# Patient Record
Sex: FEMALE | Race: WHITE | NOT HISPANIC OR LATINO | Employment: STUDENT | URBAN - METROPOLITAN AREA
[De-identification: names, ages, dates, MRNs, and addresses within clinical notes are randomized per-mention and may not be internally consistent; named-entity substitution may affect disease eponyms.]

---
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
## 2024-02-17 DIAGNOSIS — R56.9 SEIZURES (HCC): ICD-10-CM

## 2024-02-20 RX ORDER — LACOSAMIDE 100 MG/1
100 TABLET ORAL 2 TIMES DAILY
Qty: 180 TABLET | Refills: 1 | Status: SHIPPED | OUTPATIENT
Start: 2024-02-20 | End: 2024-02-29 | Stop reason: SDUPTHER

## 2024-02-20 NOTE — TELEPHONE ENCOUNTER
Received request via: Pharmacy    Medication Name/Dosage Locosamide 50MG    When was medication last prescribed 12/12/23    How many refills were previously provided 0    How many Refills does he patient have left from last prescription 0    Was the patient seen in the last year in this department? Yes   Date of last office visit 12/12/23     Per last Neurology Office Visit, when was the date of next follow up visit set for?                            Date of office visit follow up request 02/29/24     Does the patient have an upcoming appointment? Yes   If yes, when 02/29/24             If no, schedule appointment N/A    Does the patient have [facility] and need 100 day supply (blood pressure, diabetes and cholesterol meds only)? Medication is not for cholesterol, blood pressure or diabetes

## 2024-02-21 ENCOUNTER — TELEPHONE (OUTPATIENT)
Dept: NEUROLOGY | Facility: MEDICAL CENTER | Age: 19
End: 2024-02-21
Payer: MEDICAID

## 2024-02-21 NOTE — TELEPHONE ENCOUNTER
Received Refill PA request via [facility]  for Lacosamide (Vimpat) 100 MG Tabs. (Quantity:180, Day Supply:90) - Copay $0.00 (No PA req'd)     Insurance: [place] Medicaid ([facility])  Member ID:  [id number]  BIN: [id number]  PCN: [id number]  [id number]     Ran Test claim via [place] [facility] medication Pays for a $0.00 copay. Will outreach to patient to offer specialty pharmacy services and or release to preferred pharmacy

## 2024-02-28 ENCOUNTER — TELEPHONE (OUTPATIENT)
Dept: NEUROLOGY | Facility: MEDICAL CENTER | Age: 19
End: 2024-02-28
Payer: MEDICAID

## 2024-02-28 NOTE — TELEPHONE ENCOUNTER
VOICEMAIL  1. Caller Name: Tina                        Call Back Number: [phone]    2. Message: Tina called to say that her daughter had 2 seizures 1 that lasted 7 minutes and 1 that she had in her sleep. She would like a call back.     3. Patient approves office to leave a detailed voicemail/MyChart message: N\A    I TRIED CALLING PATIENT BACK BUT HAD TO LEAVE A VOICEMAIL. I LEFT MY DIRECT LINE SO SHE COULD REACH ME. I WILL SEND THE PROVIDER A NOTE SO SHE IS AWARE THAT PATIENT HAD A SEIZURE AS WELL.

## 2024-02-29 ENCOUNTER — TELEPHONE (OUTPATIENT)
Dept: NEUROLOGY | Facility: MEDICAL CENTER | Age: 19
End: 2024-02-29

## 2024-02-29 ENCOUNTER — OFFICE VISIT (OUTPATIENT)
Dept: NEUROLOGY | Facility: MEDICAL CENTER | Age: 19
End: 2024-02-29
Attending: STUDENT IN AN ORGANIZED HEALTH CARE EDUCATION/TRAINING PROGRAM
Payer: MEDICAID

## 2024-02-29 VITALS
RESPIRATION RATE: 18 BRPM | TEMPERATURE: 97.7 F | DIASTOLIC BLOOD PRESSURE: 70 MMHG | SYSTOLIC BLOOD PRESSURE: 118 MMHG | WEIGHT: 293 LBS | HEART RATE: 77 BPM | HEIGHT: 67 IN | OXYGEN SATURATION: 96 % | BODY MASS INDEX: 45.99 KG/M2

## 2024-02-29 DIAGNOSIS — R56.9 SEIZURES (HCC): ICD-10-CM

## 2024-02-29 PROCEDURE — 3074F SYST BP LT 130 MM HG: CPT | Performed by: STUDENT IN AN ORGANIZED HEALTH CARE EDUCATION/TRAINING PROGRAM

## 2024-02-29 PROCEDURE — 3078F DIAST BP <80 MM HG: CPT | Performed by: STUDENT IN AN ORGANIZED HEALTH CARE EDUCATION/TRAINING PROGRAM

## 2024-02-29 PROCEDURE — 99215 OFFICE O/P EST HI 40 MIN: CPT | Performed by: STUDENT IN AN ORGANIZED HEALTH CARE EDUCATION/TRAINING PROGRAM

## 2024-02-29 PROCEDURE — 99211 OFF/OP EST MAY X REQ PHY/QHP: CPT | Performed by: STUDENT IN AN ORGANIZED HEALTH CARE EDUCATION/TRAINING PROGRAM

## 2024-02-29 PROCEDURE — 99417 PROLNG OP E/M EACH 15 MIN: CPT | Performed by: STUDENT IN AN ORGANIZED HEALTH CARE EDUCATION/TRAINING PROGRAM

## 2024-02-29 RX ORDER — LACOSAMIDE 100 MG/1
200 TABLET ORAL 2 TIMES DAILY
Qty: 360 TABLET | Refills: 1 | Status: SHIPPED | OUTPATIENT
Start: 2024-02-29 | End: 2024-08-27

## 2024-02-29 RX ORDER — MEDROXYPROGESTERONE ACETATE 150 MG/ML
150 INJECTION, SUSPENSION INTRAMUSCULAR ONCE
COMMUNITY

## 2024-02-29 RX ORDER — LACOSAMIDE 50 MG/1
50 TABLET ORAL 2 TIMES DAILY
COMMUNITY
End: 2024-02-29

## 2024-02-29 ASSESSMENT — FIBROSIS 4 INDEX: FIB4 SCORE: 0.28

## 2024-02-29 NOTE — TELEPHONE ENCOUNTER
Lacosamide (Vimpat) 100 MG Tabs    RTS - LF 02/22/2024 Other pharmacy next available refill on or after 04/03/2024 + - 02/29/2024 2:23pm [facility]

## 2024-02-29 NOTE — PATIENT INSTRUCTIONS
-Increase Vimpat to 200 mg twice daily (two 100 mg tablets)  -Please check a Vimpat level before your morning dose of medication  -I am concerned your mood issues may be related to the Keppra. Long term we should try and get you off of this medication and onto another medication to prevent seizures if need be.   -Please contact us if there are any breakthrough seizures. MyChart is the best/fastest way usually.   -Referral to psychiatry - [facility] will call you but this can be taken anywhere  -Referral for epilepsy monitoring unit admission      Please let our office know if you have any changes in your seizure frequency and/characteristics. Otherwise, please keep the diary of your events and bring it with you at the time of your next follow up visit with our office.     Please take vitamin D3 [id number] internation units daily.     Please take folic acid 1 mg daily. This is an over-the-counter supplement that is recommended to prevent certain developmental problems in your baby, in case you become pregnant in the future.    Please let our office know as soon as you become pregnant or plan to become pregnant.    If you are caring for a baby/young child, please make sure to be sitting on a soft surface while holding your baby/young child, so in case you have a seizure, your baby/young child is not injured due to fall.     Please let us know if you observe that your baby is excessively sleepy/has other changes and the pediatrician feels that there are no other explanations except possible adverse effects of antiseizure medication(s) your are taking while nursing your baby.     Please note that the following might precipitate seizures: missed doses of antiseizure medications, being sick with fever, stress, fatigue, sleep deprivation, not eating regularly, not drinking enough water, drinking too much alcohol, stopping alcohol suddenly if you are currently using it on a regular/daily basis, and/or using recreational drugs,  among others.    Please note that the following might lead to an injury, potentially a life-threatening injury, in case you have a seizure and/or lose awareness while:   - being in a large body of water by yourself, such as bath, pool, lake, ocean, among others (risk of drowning)   - being on unprotected heights (risk of fall)   - being around and/or operating heavy machinery (risk of injury)   - being around open fire/hot surfaces (risk of burns)   - any other activities/circumstances, in which if you lose awareness, you might injure yourself and/or others.    Please call for help (crisis line and/or 911) in case you have thoughts of harming yourself and/or others.    Please abstain from driving until further notice.    ------------------------------------------------------------------------------------------  Instructions for your family/caregivers:  Please call 911 if the patient has a seizure longer than 2-3 minutes, if seizures are back to back without her recovering to her baseline, or she does not start recovering within 5-10 minutes after the seizure stops. During the seizure - please turn her on her side, please make sure her head is protected (for example, you should put a pillow under her head, if one is available), and please do not put anything in her mouth.   -------------------------------------------------------------------------------------------    It is important that your seizures are well controlled and you have none or have them rarely. In addition to avoiding injury related to breakthrough seizures, frequent seizures increase risk of SUDEP (sudden unexpected death in epilepsy), where a person goes into a seizure and then never wakes up - this is a rare complication of seizure disorder; one of the best ways to prevent it is to control your seizures well.     Due to the high volume of patients we are trying to help, your physician will not be able to respond by phone or in MyChart to your  routine concerns between appointments.  This does not reflect a lack of interest or concern for you or your diagnosis.  Please bring these questions and concerns to your appointment where your physician can answer.  Please relay more pressing concerns to our office, either via MyChart, or by phone; if not able to reach us please visit nearby Urgent Care Center or Emergency Department.  If any emergent medical needs, please seek emergent medical help and/or call 911.    Please note that we are not able to fill out paperwork that might be related to your work, utility company, disability, and/or driving, among others, in between the visits.  Please schedule a dedicated appointment to address your paperwork, so we can do that in a timely manner.  This is not due to lack of concern or interest for your disease-related work/administrative problems, but to make sure that we provide the best possible care and to fill out your paperwork in a correct and timely manner.    Thank you for entrusting your neurological care to [facility] and we look forward to continuing to serve you.
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
## 2024-03-04 ENCOUNTER — TELEPHONE (OUTPATIENT)
Dept: NEUROLOGY | Facility: MEDICAL CENTER | Age: 19
End: 2024-03-04
Payer: MEDICAID

## 2024-03-04 NOTE — TELEPHONE ENCOUNTER
03/04/24  Left voicemail for Tina (Myriam's mom) as Tina was returning my call. I was inquiring about Myriam and her last seizure. I left my direct line so she could reach me and also asked her to let me know what the best time is to reach her. HL

## 2024-04-07 RX ORDER — LEVETIRACETAM 500 MG/1
1000 TABLET ORAL
Status: DISCONTINUED | OUTPATIENT
Start: 2024-04-07 | End: 2024-04-08

## 2024-04-07 RX ORDER — LACOSAMIDE 100 MG/1
200 TABLET ORAL 2 TIMES DAILY
Status: DISCONTINUED | OUTPATIENT
Start: 2024-04-07 | End: 2024-04-11 | Stop reason: HOSPADM

## 2024-04-08 ENCOUNTER — HOSPITAL ENCOUNTER (INPATIENT)
Facility: MEDICAL CENTER | Age: 19
LOS: 3 days | DRG: 949 | End: 2024-04-11
Attending: STUDENT IN AN ORGANIZED HEALTH CARE EDUCATION/TRAINING PROGRAM | Admitting: STUDENT IN AN ORGANIZED HEALTH CARE EDUCATION/TRAINING PROGRAM
Payer: MEDICAID

## 2024-04-08 DIAGNOSIS — R11.2 NAUSEA AND VOMITING, UNSPECIFIED VOMITING TYPE: ICD-10-CM

## 2024-04-08 DIAGNOSIS — G40.109 FOCAL EPILEPSY (HCC): ICD-10-CM

## 2024-04-08 DIAGNOSIS — R51.9 NONINTRACTABLE HEADACHE, UNSPECIFIED CHRONICITY PATTERN, UNSPECIFIED HEADACHE TYPE: ICD-10-CM

## 2024-04-08 DIAGNOSIS — M54.50 ACUTE BILATERAL LOW BACK PAIN WITHOUT SCIATICA: ICD-10-CM

## 2024-04-08 DIAGNOSIS — R51.9 NONINTRACTABLE EPISODIC HEADACHE, UNSPECIFIED HEADACHE TYPE: ICD-10-CM

## 2024-04-08 PROBLEM — R56.9 SEIZURES (HCC): Status: ACTIVE | Noted: 2024-04-08

## 2024-04-08 LAB
ALBUMIN SERPL BCP-MCNC: 3.9 G/DL (ref 3.2–4.9)
ALBUMIN/GLOB SERPL: 1.3 G/DL
ALP SERPL-CCNC: 87 U/L (ref 30–99)
ALT SERPL-CCNC: 30 U/L (ref 2–50)
AMPHET UR QL SCN: NEGATIVE
ANION GAP SERPL CALC-SCNC: 11 MMOL/L (ref 7–16)
AST SERPL-CCNC: 21 U/L (ref 12–45)
BARBITURATES UR QL SCN: NEGATIVE
BASOPHILS # BLD AUTO: 0.5 % (ref 0–1.8)
BASOPHILS # BLD: 0.03 K/UL (ref 0–0.12)
BENZODIAZ UR QL SCN: NEGATIVE
BILIRUB SERPL-MCNC: 0.4 MG/DL (ref 0.1–1.5)
BUN SERPL-MCNC: 8 MG/DL (ref 8–22)
BZE UR QL SCN: NEGATIVE
CALCIUM ALBUM COR SERPL-MCNC: 8.9 MG/DL (ref 8.5–10.5)
CALCIUM SERPL-MCNC: 8.8 MG/DL (ref 8.5–10.5)
CANNABINOIDS UR QL SCN: POSITIVE
CHLORIDE SERPL-SCNC: 106 MMOL/L (ref 96–112)
CO2 SERPL-SCNC: 21 MMOL/L (ref 20–33)
CREAT SERPL-MCNC: 0.49 MG/DL (ref 0.5–1.4)
EOSINOPHIL # BLD AUTO: 0.07 K/UL (ref 0–0.51)
EOSINOPHIL NFR BLD: 1.1 % (ref 0–6.9)
ERYTHROCYTE [DISTWIDTH] IN BLOOD BY AUTOMATED COUNT: 44.8 FL (ref 35.9–50)
FENTANYL UR QL: NEGATIVE
GFR SERPLBLD CREATININE-BSD FMLA CKD-EPI: 139 ML/MIN/1.73 M 2
GLOBULIN SER CALC-MCNC: 2.9 G/DL (ref 1.9–3.5)
GLUCOSE SERPL-MCNC: 134 MG/DL (ref 65–99)
HCG SERPL QL: NEGATIVE
HCT VFR BLD AUTO: 39.8 % (ref 37–47)
HGB BLD-MCNC: 13.8 G/DL (ref 12–16)
IMM GRANULOCYTES # BLD AUTO: 0.01 K/UL (ref 0–0.11)
IMM GRANULOCYTES NFR BLD AUTO: 0.2 % (ref 0–0.9)
LYMPHOCYTES # BLD AUTO: 2.11 K/UL (ref 1–4.8)
LYMPHOCYTES NFR BLD: 33.9 % (ref 22–41)
MCH RBC QN AUTO: 31.4 PG (ref 27–33)
MCHC RBC AUTO-ENTMCNC: 34.7 G/DL (ref 32.2–35.5)
MCV RBC AUTO: 90.7 FL (ref 81.4–97.8)
METHADONE UR QL SCN: NEGATIVE
MONOCYTES # BLD AUTO: 0.49 K/UL (ref 0–0.85)
MONOCYTES NFR BLD AUTO: 7.9 % (ref 0–13.4)
NEUTROPHILS # BLD AUTO: 3.51 K/UL (ref 1.82–7.42)
NEUTROPHILS NFR BLD: 56.4 % (ref 44–72)
NRBC # BLD AUTO: 0 K/UL
NRBC BLD-RTO: 0 /100 WBC (ref 0–0.2)
OPIATES UR QL SCN: NEGATIVE
OXYCODONE UR QL SCN: NEGATIVE
PCP UR QL SCN: NEGATIVE
PLATELET # BLD AUTO: 286 K/UL (ref 164–446)
PMV BLD AUTO: 10.8 FL (ref 9–12.9)
POTASSIUM SERPL-SCNC: 3.8 MMOL/L (ref 3.6–5.5)
PROPOXYPH UR QL SCN: NEGATIVE
PROT SERPL-MCNC: 6.8 G/DL (ref 6–8.2)
RBC # BLD AUTO: 4.39 M/UL (ref 4.2–5.4)
SODIUM SERPL-SCNC: 138 MMOL/L (ref 135–145)
WBC # BLD AUTO: 6.2 K/UL (ref 4.8–10.8)

## 2024-04-08 PROCEDURE — 80053 COMPREHEN METABOLIC PANEL: CPT

## 2024-04-08 PROCEDURE — 36415 COLL VENOUS BLD VENIPUNCTURE: CPT

## 2024-04-08 PROCEDURE — 80177 DRUG SCRN QUAN LEVETIRACETAM: CPT

## 2024-04-08 PROCEDURE — 95700 EEG CONT REC W/VID EEG TECH: CPT | Performed by: STUDENT IN AN ORGANIZED HEALTH CARE EDUCATION/TRAINING PROGRAM

## 2024-04-08 PROCEDURE — 99223 1ST HOSP IP/OBS HIGH 75: CPT | Mod: 25 | Performed by: STUDENT IN AN ORGANIZED HEALTH CARE EDUCATION/TRAINING PROGRAM

## 2024-04-08 PROCEDURE — 700111 HCHG RX REV CODE 636 W/ 250 OVERRIDE (IP): Mod: JZ | Performed by: STUDENT IN AN ORGANIZED HEALTH CARE EDUCATION/TRAINING PROGRAM

## 2024-04-08 PROCEDURE — 770020 HCHG ROOM/CARE - TELE (206)

## 2024-04-08 PROCEDURE — 84703 CHORIONIC GONADOTROPIN ASSAY: CPT

## 2024-04-08 PROCEDURE — A9270 NON-COVERED ITEM OR SERVICE: HCPCS | Performed by: STUDENT IN AN ORGANIZED HEALTH CARE EDUCATION/TRAINING PROGRAM

## 2024-04-08 PROCEDURE — 700102 HCHG RX REV CODE 250 W/ 637 OVERRIDE(OP): Performed by: STUDENT IN AN ORGANIZED HEALTH CARE EDUCATION/TRAINING PROGRAM

## 2024-04-08 PROCEDURE — 95716 VEEG EA 12-26HR CONT MNTR: CPT | Performed by: STUDENT IN AN ORGANIZED HEALTH CARE EDUCATION/TRAINING PROGRAM

## 2024-04-08 PROCEDURE — 85025 COMPLETE CBC W/AUTO DIFF WBC: CPT

## 2024-04-08 PROCEDURE — 95720 EEG PHY/QHP EA INCR W/VEEG: CPT | Performed by: STUDENT IN AN ORGANIZED HEALTH CARE EDUCATION/TRAINING PROGRAM

## 2024-04-08 PROCEDURE — 80307 DRUG TEST PRSMV CHEM ANLYZR: CPT

## 2024-04-08 PROCEDURE — 80235 DRUG ASSAY LACOSAMIDE: CPT

## 2024-04-08 PROCEDURE — 4A10X4Z MONITORING OF CENTRAL NERVOUS ELECTRICAL ACTIVITY, EXTERNAL APPROACH: ICD-10-PCS | Performed by: STUDENT IN AN ORGANIZED HEALTH CARE EDUCATION/TRAINING PROGRAM

## 2024-04-08 RX ORDER — POLYETHYLENE GLYCOL 3350 17 G/17G
1 POWDER, FOR SOLUTION ORAL
Status: DISCONTINUED | OUTPATIENT
Start: 2024-04-08 | End: 2024-04-11 | Stop reason: HOSPADM

## 2024-04-08 RX ORDER — METOCLOPRAMIDE HYDROCHLORIDE 5 MG/ML
10 INJECTION INTRAMUSCULAR; INTRAVENOUS EVERY 6 HOURS PRN
Status: DISCONTINUED | OUTPATIENT
Start: 2024-04-08 | End: 2024-04-11 | Stop reason: HOSPADM

## 2024-04-08 RX ORDER — ONDANSETRON 4 MG/1
4 TABLET, ORALLY DISINTEGRATING ORAL EVERY 4 HOURS PRN
Status: DISCONTINUED | OUTPATIENT
Start: 2024-04-08 | End: 2024-04-11 | Stop reason: HOSPADM

## 2024-04-08 RX ORDER — ONDANSETRON 2 MG/ML
4 INJECTION INTRAMUSCULAR; INTRAVENOUS EVERY 4 HOURS PRN
Status: DISCONTINUED | OUTPATIENT
Start: 2024-04-08 | End: 2024-04-11 | Stop reason: HOSPADM

## 2024-04-08 RX ORDER — LORAZEPAM 2 MG/ML
1 INJECTION INTRAMUSCULAR
Status: DISCONTINUED | OUTPATIENT
Start: 2024-04-08 | End: 2024-04-11 | Stop reason: HOSPADM

## 2024-04-08 RX ORDER — AMOXICILLIN 250 MG
2 CAPSULE ORAL 2 TIMES DAILY
Status: DISCONTINUED | OUTPATIENT
Start: 2024-04-08 | End: 2024-04-11 | Stop reason: HOSPADM

## 2024-04-08 RX ORDER — IBUPROFEN 600 MG/1
600 TABLET ORAL EVERY 6 HOURS PRN
Status: DISCONTINUED | OUTPATIENT
Start: 2024-04-08 | End: 2024-04-09

## 2024-04-08 RX ORDER — ENOXAPARIN SODIUM 100 MG/ML
40 INJECTION SUBCUTANEOUS DAILY
Status: DISCONTINUED | OUTPATIENT
Start: 2024-04-08 | End: 2024-04-11 | Stop reason: HOSPADM

## 2024-04-08 RX ORDER — LORAZEPAM 2 MG/ML
2 INJECTION INTRAMUSCULAR
Status: DISCONTINUED | OUTPATIENT
Start: 2024-04-08 | End: 2024-04-11 | Stop reason: HOSPADM

## 2024-04-08 RX ADMIN — LACOSAMIDE 200 MG: 100 TABLET, FILM COATED ORAL at 21:07

## 2024-04-08 RX ADMIN — ENOXAPARIN SODIUM 40 MG: 100 INJECTION SUBCUTANEOUS at 17:04

## 2024-04-08 RX ADMIN — METOCLOPRAMIDE 10 MG: 5 INJECTION, SOLUTION INTRAMUSCULAR; INTRAVENOUS at 22:17

## 2024-04-08 RX ADMIN — IBUPROFEN 600 MG: 600 TABLET, FILM COATED ORAL at 11:24

## 2024-04-08 RX ADMIN — SENNOSIDES AND DOCUSATE SODIUM 2 TABLET: 50; 8.6 TABLET ORAL at 17:04

## 2024-04-08 RX ADMIN — IBUPROFEN 600 MG: 600 TABLET, FILM COATED ORAL at 20:04

## 2024-04-08 RX ADMIN — ONDANSETRON 4 MG: 2 INJECTION INTRAMUSCULAR; INTRAVENOUS at 19:49

## 2024-04-08 RX ADMIN — ONDANSETRON 4 MG: 2 INJECTION INTRAMUSCULAR; INTRAVENOUS at 11:24

## 2024-04-08 ASSESSMENT — COGNITIVE AND FUNCTIONAL STATUS - GENERAL
SUGGESTED CMS G CODE MODIFIER DAILY ACTIVITY: CH
MOBILITY SCORE: 24
DAILY ACTIVITIY SCORE: 24
SUGGESTED CMS G CODE MODIFIER MOBILITY: CH

## 2024-04-08 ASSESSMENT — LIFESTYLE VARIABLES
EVER FELT BAD OR GUILTY ABOUT YOUR DRINKING: NO
ON A TYPICAL DAY WHEN YOU DRINK ALCOHOL HOW MANY DRINKS DO YOU HAVE: 0
TOTAL SCORE: 0
CONSUMPTION TOTAL: NEGATIVE
HAVE YOU EVER FELT YOU SHOULD CUT DOWN ON YOUR DRINKING: NO
HAVE PEOPLE ANNOYED YOU BY CRITICIZING YOUR DRINKING: NO
HOW MANY TIMES IN THE PAST YEAR HAVE YOU HAD 5 OR MORE DRINKS IN A DAY: 0
AVERAGE NUMBER OF DAYS PER WEEK YOU HAVE A DRINK CONTAINING ALCOHOL: 0
TOTAL SCORE: 0
ALCOHOL_USE: NO
TOTAL SCORE: 0
EVER HAD A DRINK FIRST THING IN THE MORNING TO STEADY YOUR NERVES TO GET RID OF A HANGOVER: NO

## 2024-04-08 ASSESSMENT — PAIN DESCRIPTION - PAIN TYPE
TYPE: ACUTE PAIN
TYPE: ACUTE PAIN

## 2024-04-08 ASSESSMENT — FIBROSIS 4 INDEX: FIB4 SCORE: 0.28

## 2024-04-08 NOTE — CARE PLAN
The patient is Stable - Low risk of patient condition declining or worsening    Shift Goals  Clinical Goals: neuro exam, monitor for seizures  Patient Goals: updates  Family Goals: updates    Progress made toward(s) clinical / shift goals:    Problem: Seizure Precautions  Goal: Implementation of seizure precautions  Outcome: Progressing     Problem: Seizure EEG Monitoring  Goal: Appropriate Monitoring of EEG patient  Outcome: Progressing     Problem: Neuro Status  Goal: Neuro status will remain stable or improve  Outcome: Progressing       Patient is not progressing towards the following goals:
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
## 2024-04-08 NOTE — DIETARY
NUTRITION SERVICES: BMI - Pt with BMI >40 (=Body mass index is 42.36 kg/m².), morbid obesity. Weight taken via bed scale and no fluids in I/O. Weight loss counseling not appropriate in acute care setting. RECOMMEND - Referral to outpatient nutrition services for weight management, or F/u with MD/RD after D/C.

## 2024-04-08 NOTE — PROCEDURES
VIDEO ELECTROENCEPHALOGRAM REPORT - EPILEPSY MONITORING UNIT (EMU)     REFERRING PROVIDER: Dr Nicholas  DOS: 4/8/2024   ROOM: [place] [id number]   TOTAL RECORDING TIME: 22 hours and 0 minutes of total recording time    INDICATION:  Myriam Piper 18 y.o. female presenting with history of seizures and/or epilepsy    RELEVANT MEDICATIONS/TREATMENTS:   Vimpat 200 mg BID  Keppra 1000 mg in AM of 04/08/24 and then Dc'ed thereafter     TECHNIQUE:   CVEEG was set up by a Neurodiagnostic technologist who performed education to the patient and staff. A minimum of 23 electrodes and 23 channel recording was setup and performed by Neurodiagnostic technologist, in accordance with the international 10-20 system. Impedence, electrode integrity, and technical impressions were documented a minimum of every 2-24 hour period by a Neurodiagnostic Technologist and reviewed by Interpreting Physician. The study was reviewed in bipolar and referential montages. The recording examined the patient in the awake, drowsy, and sleep state(s).     DESCRIPTION OF THE RECORD:  During wakefulness, the background was continuous and showed a 9 Hz posterior dominant rhythm.  There was reactivity to eye closure/opening.  A normal anterior-posterior gradient was noted with faster beta frequencies seen anteriorly.  During drowsiness, theta/delta frequencies were seen.    Sleep was captured and was characterized by diffuse background delta/theta activity with a loss of myogenic artifact.  N2 sleep transients in the form of sleep spindles and vertex waves were seen in the leads over the central regions.     ICTAL AND INTERICTAL FINDINGS:   Frequent posterior quadrant (occipital>parietal/posterior temporal) sharp waves, often with a shifting asymmetry but generally more frequently maximal over the left>right posterior quadrant.  At times, discharges became briefly periodic or semi-periodic at 1-1.5 Hz and became abundantly present in sleep.        Intermittent posterior  quadrant slowing, at times with a shifting asymmetry and rarely becoming rhythmic at 1.5-2.5 Hz.    Definitive seizures    ACTIVATION PROCEDURES:   Hyperventilation was performed by the patient for a total of 3 minutes. The technician performing the test noted good effort. This technique did not elicited any abnormalities on EEG.  and Intermittent Photic stimulation was performed in a stepwise fashion from 1 to 30 Hz and did not elicited any abnormalities on EEG.     EKG: Sampling of the EKG recording showed sinus rhythm    EVENTS:    No clinical events were captured or reported.        INTERPRETATION:  Abnormal, technically-limited video EEG recording in the awake, drowsy, and sleep state(s):  No definitive seizures.   Intermittent posterior quadrant slowing, at times with a shifting asymmetry and rarely becoming rhythmic at 1.5-2.5 Hz.  Frequent posterior quadrant (occipital max) discharges, often with a shifting asymmetry but generally more frequently maximal over the left>right posterior quadrant.  They became abundantly present in sleep. At times, discharges became briefly periodic or semi-periodic at 1-1.5 Hz. These abnormalities are similar to prior EEGs and are most consistent with a focal epilepsy.   No clinical events        Willie Cutler MD  Department of Neurology at [facility]  General Neurologist and Epileptologist  Director of [facility]'s Level III Comprehensive Epilepsy Program  Professor of Clinical Neurology, [facility].   Phone: [phone]  Fax: [phone]  E-mail: [email or web address]

## 2024-04-08 NOTE — PROGRESS NOTES
Med Rec complete per patient's mother at bedside   Allergies reviewed  Antibiotics in the past 30 days:no  Anticoagulant in past 14 days:no  Pharmacy patient utilizes:[facility] on [address] in [place]

## 2024-04-09 LAB
APPEARANCE UR: CLEAR
BACTERIA #/AREA URNS HPF: ABNORMAL /HPF
BILIRUB UR QL STRIP.AUTO: NEGATIVE
COLOR UR: YELLOW
EPI CELLS #/AREA URNS HPF: ABNORMAL /HPF
GLUCOSE UR STRIP.AUTO-MCNC: NEGATIVE MG/DL
HYALINE CASTS #/AREA URNS LPF: ABNORMAL /LPF
KETONES UR STRIP.AUTO-MCNC: NEGATIVE MG/DL
LEUKOCYTE ESTERASE UR QL STRIP.AUTO: ABNORMAL
LEVETIRACETAM SERPL-MCNC: <2 UG/ML (ref 10–40)
MICRO URNS: ABNORMAL
NITRITE UR QL STRIP.AUTO: NEGATIVE
PH UR STRIP.AUTO: 6.5 [PH] (ref 5–8)
PROT UR QL STRIP: NEGATIVE MG/DL
RBC # URNS HPF: ABNORMAL /HPF
RBC UR QL AUTO: ABNORMAL
SP GR UR STRIP.AUTO: 1.01
UROBILINOGEN UR STRIP.AUTO-MCNC: 0.2 MG/DL
WBC #/AREA URNS HPF: ABNORMAL /HPF

## 2024-04-09 PROCEDURE — 4A10X4Z MONITORING OF CENTRAL NERVOUS ELECTRICAL ACTIVITY, EXTERNAL APPROACH: ICD-10-PCS | Performed by: STUDENT IN AN ORGANIZED HEALTH CARE EDUCATION/TRAINING PROGRAM

## 2024-04-09 PROCEDURE — 700101 HCHG RX REV CODE 250: Performed by: STUDENT IN AN ORGANIZED HEALTH CARE EDUCATION/TRAINING PROGRAM

## 2024-04-09 PROCEDURE — 95716 VEEG EA 12-26HR CONT MNTR: CPT | Performed by: STUDENT IN AN ORGANIZED HEALTH CARE EDUCATION/TRAINING PROGRAM

## 2024-04-09 PROCEDURE — 99233 SBSQ HOSP IP/OBS HIGH 50: CPT | Mod: 25 | Performed by: STUDENT IN AN ORGANIZED HEALTH CARE EDUCATION/TRAINING PROGRAM

## 2024-04-09 PROCEDURE — 81001 URINALYSIS AUTO W/SCOPE: CPT

## 2024-04-09 PROCEDURE — A9270 NON-COVERED ITEM OR SERVICE: HCPCS | Performed by: STUDENT IN AN ORGANIZED HEALTH CARE EDUCATION/TRAINING PROGRAM

## 2024-04-09 PROCEDURE — 95720 EEG PHY/QHP EA INCR W/VEEG: CPT | Performed by: STUDENT IN AN ORGANIZED HEALTH CARE EDUCATION/TRAINING PROGRAM

## 2024-04-09 PROCEDURE — 700102 HCHG RX REV CODE 250 W/ 637 OVERRIDE(OP): Performed by: STUDENT IN AN ORGANIZED HEALTH CARE EDUCATION/TRAINING PROGRAM

## 2024-04-09 PROCEDURE — 700111 HCHG RX REV CODE 636 W/ 250 OVERRIDE (IP): Performed by: STUDENT IN AN ORGANIZED HEALTH CARE EDUCATION/TRAINING PROGRAM

## 2024-04-09 PROCEDURE — 770020 HCHG ROOM/CARE - TELE (206)

## 2024-04-09 RX ORDER — ACETAMINOPHEN 325 MG/1
975 TABLET ORAL EVERY 8 HOURS PRN
Status: DISCONTINUED | OUTPATIENT
Start: 2024-04-09 | End: 2024-04-11 | Stop reason: HOSPADM

## 2024-04-09 RX ORDER — TOPIRAMATE 100 MG/1
100 TABLET, FILM COATED ORAL EVERY 12 HOURS
Status: DISCONTINUED | OUTPATIENT
Start: 2024-04-09 | End: 2024-04-11 | Stop reason: HOSPADM

## 2024-04-09 RX ORDER — KETOROLAC TROMETHAMINE 15 MG/ML
15 INJECTION, SOLUTION INTRAMUSCULAR; INTRAVENOUS EVERY 6 HOURS PRN
Status: DISCONTINUED | OUTPATIENT
Start: 2024-04-09 | End: 2024-04-11 | Stop reason: HOSPADM

## 2024-04-09 RX ADMIN — POLYETHYLENE GLYCOL 3350 1 PACKET: 17 POWDER, FOR SOLUTION ORAL at 08:15

## 2024-04-09 RX ADMIN — MAGNESIUM HYDROXIDE 30 ML: 1200 LIQUID ORAL at 16:25

## 2024-04-09 RX ADMIN — LACOSAMIDE 200 MG: 100 TABLET, FILM COATED ORAL at 20:37

## 2024-04-09 RX ADMIN — TOPIRAMATE 100 MG: 100 TABLET, FILM COATED ORAL at 17:24

## 2024-04-09 RX ADMIN — ACETAMINOPHEN 975 MG: 325 TABLET, FILM COATED ORAL at 20:35

## 2024-04-09 RX ADMIN — LACOSAMIDE 200 MG: 100 TABLET, FILM COATED ORAL at 08:05

## 2024-04-09 RX ADMIN — KETOROLAC TROMETHAMINE 15 MG: 15 INJECTION, SOLUTION INTRAMUSCULAR; INTRAVENOUS at 16:25

## 2024-04-09 RX ADMIN — SENNOSIDES AND DOCUSATE SODIUM 2 TABLET: 50; 8.6 TABLET ORAL at 06:36

## 2024-04-09 RX ADMIN — ENOXAPARIN SODIUM 40 MG: 100 INJECTION SUBCUTANEOUS at 16:25

## 2024-04-09 RX ADMIN — IBUPROFEN 600 MG: 600 TABLET, FILM COATED ORAL at 12:04

## 2024-04-09 RX ADMIN — KETOROLAC TROMETHAMINE 15 MG: 15 INJECTION, SOLUTION INTRAMUSCULAR; INTRAVENOUS at 22:49

## 2024-04-09 RX ADMIN — SENNOSIDES AND DOCUSATE SODIUM 2 TABLET: 50; 8.6 TABLET ORAL at 16:25

## 2024-04-09 ASSESSMENT — PAIN DESCRIPTION - PAIN TYPE
TYPE: ACUTE PAIN

## 2024-04-09 NOTE — CARE PLAN
The patient is Stable - Low risk of patient condition declining or worsening    Shift Goals  Clinical Goals: Stable neuro exam, monitor for seizures  Patient Goals: Sleep  Family Goals: Watch for seizures    Progress made toward(s) clinical / shift goals:    Problem: Knowledge Deficit - Seizures  Goal: Knowledge of seizure treatment regimen will improve  Description: Target End Date:  1-3 days or as soon as patient condition allows    Document in Patient Education    1.  Educate patient and family/caregiver of importance of seizure precautions in hospital  2.  Educate patient and family/caregiver to report auras and triggers prior to events/ EMU event reporting  3.  Review anticonvulsant medication regimen and side effects  Outcome: Progressing     Problem: Seizure EEG Monitoring  Goal: Appropriate Monitoring of EEG patient  Description: Target End Date:  Prior to discharge or change in level of care    1.  Ensure patient in view of camera at all times, do not block camera, curtains are pulled, door remains open  2.  May be off camera for bathroom privileges with assistance from staff, do not leave patient in bathroom unattended  3.  Face to face evaluation every 10 minutes or continuous video monitoring by sitter  4.  Review order for remote cardiac monitoring  5.  Press event button, observe and document events  Outcome: Progressing     Problem: Seizure Precautions  Goal: Implementation of seizure precautions  Description: Target End Date:  Prior to discharge or change in level of care    1.  Padded side rails up at all times  2.  Suction equipment and oxygen delivery system at bedside  3.  Continuous pulse oximeter in use  4.  Implement fall precautions, bed alarm on, bed in lowest position  5.  IV access (per order)  6.  Provide low stimulus environment, avoid exposure to triggers  7.  Instruct patient to use call light/seizure button if having warning signs of impending seizure  Outcome: Progressing        Patient is not progressing towards the following goals:

## 2024-04-09 NOTE — PROGRESS NOTES
"NEUROLOGY EMU PROGRESS NOTE 4/9/2024     REFERRING PROVIDER: Dr. Nicholas    REASON FOR ADMISSION: Reason for EMU Admission: Medication titration/optimization and Control seizure     HISTORY OF PRESENT ILLNESS: Myriam Piper is a 18 y.o. female who presents to the EMU for evaluation. Per Dr. Nicholas, patient has had significant mood disturbances likely as a side effect of the Keppra because these symptoms started around the same time as her seizures recurred and Keppra was prescribed.  Plan/Goal of the EMU admission is to stop the Keppra on admission, monitor EEG on Vimpat and add a second agent if necessary.    INTERVAL HISTORY     No seizures. No push button events. Patient's EEG is similar to prior EEGs with posterior discharges, often left>right, and posterior slowing, at times rhythmic. Keppra was stopped last night. Vimpat was continued 200 mg BID.    Patient was sad last night. She said this morning she was homesick and wanted to go home. She feels a bit better today. She is complaining of right>left back pain. No fever, chills, or dysuria    She has some occasional nausea not helped with zofran. Thereofr Reglan was added    Discussed with patient, mother about starting a new medicine today, toapmax which she has not tried before        COMPLETE HOME MED LIST:   topiramate, 100 mg, Q12HRS  enoxaparin (LOVENOX) injection, 40 mg, DAILY AT 1800  senna-docusate, 2 Tablet, BID  lacosamide, 200 mg, BID        PHYSICAL EXAM:   BP (!) 131/94   Pulse 88   Temp 36.7 °C (98.1 °F) (Temporal)   Resp 18   Ht 1.702 m (5' 7.01\")   Wt (!) 137 kg (301 lb 2.4 oz)   SpO2 93%     Physical Exam  HENT:      Head: Normocephalic and atraumatic.      Nose: Nose normal. No congestion.      Mouth/Throat:      Pharynx: No oropharyngeal exudate.   Eyes:      Extraocular Movements: EOM normal. No nystagmus.   Cardiovascular:      Rate and Rhythm: Normal rate and regular rhythm.   Musculoskeletal:      Cervical back: Normal range of " "motion.   Neurological:      Mental Status: She is alert.   Psychiatric:         Speech: Speech normal.        NEUROLOGICAL EXAM:   Neurological Exam  Mental Status  Alert. Speech is normal. Language is fluent with no aphasia. Attention and concentration are normal.    Cranial Nerves  CN III, IV, VI: Extraocular movements intact bilaterally. No nystagmus. Normal saccades. Normal smooth pursuit.  CN V: Facial sensation is normal.  CN VII: Full and symmetric facial movement.  CN XI: Shoulder shrug strength is normal.  CN XII: Tongue midline without atrophy or fasciculations.    Motor   No abnormal involuntary movements. No pronator drift.    Sensory  Light touch is normal in upper and lower extremities.     Coordination  Right: Finger-to-nose normal.Left: Finger-to-nose normal.    Gait    Deferred.       Assessment & Plan  , AND EDUCATION/COUNSELING  Myriam Piper is a 18 y.o. female who presents to the EMU for evaluation. Per Dr. Nicholas, patient has had significant mood disturbances likely as a side effect of the Keppra because these symptoms started around the same time as her seizures recurred and Keppra was prescribed.  Plan/Goal of the EMU admission is to stop the Keppra on admission (which has already been done), monitor EEG on Vimpat and add a second agent.    Adding topamax starting evening of 04/09/24 at 100 mg BID. Will see how she tolerates it.       FOCAL EPILEPSY  Semiology  1) GTC without aura.   Gasps for air right before a seizure. Eyes \"rolled back in the head\", foaming at the mouth, generalized tonic clonic movements. (+) History of tongue biting and urinary incontinence. Afterwards, if she is laying on the floor she is still convulsing and shivering. Eyes are open but she is not speaking or moving, just shivering like she is cold     2) Behavioral arrest with staring   Able to come out of it if someone tries to get her attention by snapping.      3) Twitching episodes at random  Continuous VEEG " monitoring  Vitals and neurological checks as ordered  Telemetry  Admission labs  Other diagnostics if applicable: NA  HV and PS to be performed on Day 1 of admission and at the discretion of the attending epileptologist on subsequent days  Rescue Ativan Protocol ordered  Sleep deprivation: No  ANTISEIZURE REGIMEN  DAY 1:  Continue home vimpat 200 mg BID  Stopped keppra 1000 mg BID after the morning dose was taken on 04/08/24     Mood disturbance/irritability  -likely a side effect of keppra  -keppra stopped upon admission as detailed above  -she should still follow-up with psychiatry in the outpatient setting    Intractable Nausea  -Zonfran and reglan prn      Back pain  -Analgesics prn (IV toradol and tylenol)  -urinalysis to rule out infection   Heating pas    OTHER ITEMS:  DVT Ppx: Lovenox and/or SCDs  DIET: Regular  Bowel regimen  PRN analgesics available for pain  PRN antiemetics available    CODE STATUS:   FULL CODE    BILLING DOCUMENTATION:     I spent a total of 70 minutes of face-to-face time in this visit. Over 50% of the time of the visit today was spent on counseling and/or coordination of care wtih the patient and/or family, as above in assessment in plan.    Willie Cutler MD  Department of Neurology at [facility]  General Neurologist and Epileptologist  Director of [facility]'s Level III Comprehensive Epilepsy Program  Professor of Clinical Neurology, [facility].   Phone: [phone]  Fax: [phone]  E-mail: [email or web address]

## 2024-04-09 NOTE — CARE PLAN
The patient is Stable - Low risk of patient condition declining or worsening    Shift Goals  Clinical Goals: Safety/mobility  Patient Goals: Sleep  Family Goals: Watch for seizures    Progress made toward(s) clinical / shift goals:    Problem: Seizure EEG Monitoring  Goal: Appropriate Monitoring of EEG patient  Outcome: Progressing   Pt on continuous VEEG monitoring with 1:1 human sitter. Pt understands the use of the Natus alarm and how to push the button when feeling an event coming on.  Fall precautions in place. Call light, bedside table, and pt belongings within reach. Pt able to demonstrate the use of call light prior to getting out of bed.     Patient is not progressing towards the following goals:N/A

## 2024-04-09 NOTE — PROCEDURES
VIDEO ELECTROENCEPHALOGRAM REPORT - EPILEPSY MONITORING UNIT (EMU)     REFERRING PROVIDER: Dr Nicholas  DOS: 4/9/2024   ROOM: [place] [id number]   TOTAL RECORDING TIME: 23 hours and 59 minutes of total recording time    INDICATION:  Mryiam Piper 18 y.o. female presenting with history of seizures and/or epilepsy    RELEVANT MEDICATIONS/TREATMENTS:   Vimpat 200 mg BID  Topamax 100 mg BID started the evening of  04/9/24     TECHNIQUE:   CVEEG was set up by a Neurodiagnostic technologist who performed education to the patient and staff. A minimum of 23 electrodes and 23 channel recording was setup and performed by Neurodiagnostic technologist, in accordance with the international 10-20 system. Impedence, electrode integrity, and technical impressions were documented a minimum of every 2-24 hour period by a Neurodiagnostic Technologist and reviewed by Interpreting Physician. The study was reviewed in bipolar and referential montages. The recording examined the patient in the awake, drowsy, and sleep state(s).     DESCRIPTION OF THE RECORD:  During wakefulness, the background was continuous and showed a 9 Hz posterior dominant rhythm.  There was reactivity to eye closure/opening.  A normal anterior-posterior gradient was noted with faster beta frequencies seen anteriorly.  During drowsiness, theta/delta frequencies were seen.    Sleep was captured and was characterized by diffuse background delta/theta activity with a loss of myogenic artifact.  N2 sleep transients in the form of sleep spindles and vertex waves were seen in the leads over the central regions.     ICTAL AND INTERICTAL FINDINGS:   Frequent posterior quadrant (occipital>parietal/posterior temporal) sharp waves, often with a shifting asymmetry but generally more frequently maximal over the left>right posterior quadrant.  At times, discharges became briefly periodic or semi-periodic at 1-1.5 Hz and became abundantly present in sleep.        Intermittent posterior  quadrant slowing, at times with a shifting asymmetry and rarely becoming rhythmic at 1.5-2.5 Hz.    Rare generalized or frontally maximal sharply contoured rhythmic 1.5-2 Hz delta activity, findings that are of unclear significance.     Definitive seizures    ACTIVATION PROCEDURES:   NA    EKG: Sampling of the EKG recording showed sinus rhythm    EVENTS:    No clinical events were captured or reported.      NOTE: This is a technically-limited study due to abundant myogenic, movement, and lead artifact obscuring most of the recording.      INTERPRETATION:  Abnormal, technically-limited video EEG recording in the awake, drowsy, and sleep state(s):  No definitive seizures.   Intermittent posterior quadrant slowing, at times with a shifting asymmetry and rarely becoming rhythmic at 1.5-2.5 Hz.  Frequent posterior quadrant (occipital max) discharges, often with a shifting asymmetry but generally more frequently maximal over the left>right posterior quadrant.  They became abundantly present in sleep. At times, discharges became briefly periodic or semi-periodic at 1-1.5 Hz. These abnormalities are similar to prior EEGs and are most consistent with a focal epilepsy.   No clinical events  Compared to yesterday's study, this one is similar        Willie Cutler MD  Department of Neurology at [facility]  General Neurologist and Epileptologist  Director of [facility]'s Level III Comprehensive Epilepsy Program  Professor of Clinical Neurology, [facility].   Phone: [phone]  Fax: [phone]  E-mail: [email or web address]

## 2024-04-10 PROCEDURE — 4A10X4Z MONITORING OF CENTRAL NERVOUS ELECTRICAL ACTIVITY, EXTERNAL APPROACH: ICD-10-PCS | Performed by: STUDENT IN AN ORGANIZED HEALTH CARE EDUCATION/TRAINING PROGRAM

## 2024-04-10 PROCEDURE — 95716 VEEG EA 12-26HR CONT MNTR: CPT | Performed by: STUDENT IN AN ORGANIZED HEALTH CARE EDUCATION/TRAINING PROGRAM

## 2024-04-10 PROCEDURE — 700101 HCHG RX REV CODE 250: Performed by: STUDENT IN AN ORGANIZED HEALTH CARE EDUCATION/TRAINING PROGRAM

## 2024-04-10 PROCEDURE — 700102 HCHG RX REV CODE 250 W/ 637 OVERRIDE(OP): Performed by: STUDENT IN AN ORGANIZED HEALTH CARE EDUCATION/TRAINING PROGRAM

## 2024-04-10 PROCEDURE — 99233 SBSQ HOSP IP/OBS HIGH 50: CPT | Mod: 25 | Performed by: STUDENT IN AN ORGANIZED HEALTH CARE EDUCATION/TRAINING PROGRAM

## 2024-04-10 PROCEDURE — 770020 HCHG ROOM/CARE - TELE (206)

## 2024-04-10 PROCEDURE — 95720 EEG PHY/QHP EA INCR W/VEEG: CPT | Performed by: STUDENT IN AN ORGANIZED HEALTH CARE EDUCATION/TRAINING PROGRAM

## 2024-04-10 PROCEDURE — A9270 NON-COVERED ITEM OR SERVICE: HCPCS | Performed by: STUDENT IN AN ORGANIZED HEALTH CARE EDUCATION/TRAINING PROGRAM

## 2024-04-10 PROCEDURE — 700111 HCHG RX REV CODE 636 W/ 250 OVERRIDE (IP): Mod: JZ | Performed by: STUDENT IN AN ORGANIZED HEALTH CARE EDUCATION/TRAINING PROGRAM

## 2024-04-10 RX ADMIN — LACOSAMIDE 200 MG: 100 TABLET, FILM COATED ORAL at 20:38

## 2024-04-10 RX ADMIN — KETOROLAC TROMETHAMINE 15 MG: 15 INJECTION, SOLUTION INTRAMUSCULAR; INTRAVENOUS at 16:40

## 2024-04-10 RX ADMIN — ENOXAPARIN SODIUM 40 MG: 100 INJECTION SUBCUTANEOUS at 16:39

## 2024-04-10 RX ADMIN — TOPIRAMATE 100 MG: 100 TABLET, FILM COATED ORAL at 16:40

## 2024-04-10 RX ADMIN — SENNOSIDES AND DOCUSATE SODIUM 2 TABLET: 50; 8.6 TABLET ORAL at 16:39

## 2024-04-10 RX ADMIN — TOPIRAMATE 100 MG: 100 TABLET, FILM COATED ORAL at 06:11

## 2024-04-10 RX ADMIN — POLYETHYLENE GLYCOL 3350 1 PACKET: 17 POWDER, FOR SOLUTION ORAL at 13:51

## 2024-04-10 RX ADMIN — KETOROLAC TROMETHAMINE 15 MG: 15 INJECTION, SOLUTION INTRAMUSCULAR; INTRAVENOUS at 06:14

## 2024-04-10 RX ADMIN — SENNOSIDES AND DOCUSATE SODIUM 2 TABLET: 50; 8.6 TABLET ORAL at 06:11

## 2024-04-10 RX ADMIN — LACOSAMIDE 200 MG: 100 TABLET, FILM COATED ORAL at 09:32

## 2024-04-10 ASSESSMENT — FIBROSIS 4 INDEX: FIB4 SCORE: 0.25

## 2024-04-10 ASSESSMENT — PAIN DESCRIPTION - PAIN TYPE
TYPE: ACUTE PAIN

## 2024-04-10 NOTE — PROGRESS NOTES
Monitor Summary: SR 65-91, MA 0.19, QRS 0.09, QT 0.35, with rare PACs per strip from monitor room.

## 2024-04-10 NOTE — CARE PLAN
The patient is Stable - Low risk of patient condition declining or worsening    Shift Goals  Clinical Goals: Safety/mobility  Patient Goals: Pain control  Family Goals: Pt comfort    Progress made toward(s) clinical / shift goals:    Problem: Pain - Standard  Goal: Alleviation of pain or a reduction in pain to the patient’s comfort goal  Outcome: Progressing   0-10 pain scale in place, encourage pt to notify this RN if pain medication Is needed.     Problem: Seizure EEG Monitoring  Goal: Appropriate Monitoring of EEG patient  Outcome: Progressing   24 hour continuous VEEG monitoring in place with 1:1 human sitter. Pt educated on how to push the Natus alarm if feeling signs of seizure.     Patient is not progressing towards the following goals: N/A

## 2024-04-10 NOTE — PROCEDURES
VIDEO ELECTROENCEPHALOGRAM REPORT - EPILEPSY MONITORING UNIT (EMU)     REFERRING PROVIDER: Dr Nicholas  DOS: 4/10/2024   ROOM: [place] [address]   TOTAL RECORDING TIME: 19 hours and 30 minutes of total recording time    INDICATION:  Myriam Piper 18 y.o. female presenting with history of seizures and/or epilepsy    RELEVANT MEDICATIONS/TREATMENTS:   Vimpat 200 mg BID  Topamax 100 mg BID started the evening of  04/9/24     TECHNIQUE:   CVEEG was set up by a Neurodiagnostic technologist who performed education to the patient and staff. A minimum of 23 electrodes and 23 channel recording was setup and performed by Neurodiagnostic technologist, in accordance with the international 10-20 system. Impedence, electrode integrity, and technical impressions were documented a minimum of every 2-24 hour period by a Neurodiagnostic Technologist and reviewed by Interpreting Physician. The study was reviewed in bipolar and referential montages. The recording examined the patient in the awake, drowsy, and sleep state(s).     DESCRIPTION OF THE RECORD:  During wakefulness, the background was continuous and showed a 9 Hz posterior dominant rhythm.  There was reactivity to eye closure/opening.  A normal anterior-posterior gradient was noted with faster beta frequencies seen anteriorly.  During drowsiness, theta/delta frequencies were seen.    Sleep was captured and was characterized by diffuse background delta/theta activity with a loss of myogenic artifact.  N2 sleep transients in the form of sleep spindles and vertex waves were seen in the leads over the central regions.     ICTAL AND INTERICTAL FINDINGS:   Frequent posterior quadrant (occipital>parietal/posterior temporal) sharp waves, often with a shifting asymmetry but generally more frequently maximal over the left>right posterior quadrant.  At times, discharges became briefly periodic or semi-periodic at 1-1.5 Hz and became abundantly present in sleep.        Intermittent posterior  quadrant slowing, at times with a shifting asymmetry and rarely becoming rhythmic at 1.5-2.5 Hz.    Rare generalized or frontally maximal sharply contoured rhythmic 1.5-2 Hz delta activity, findings that are of unclear significance.     Definitive seizures    ACTIVATION PROCEDURES:   NA    EKG: Sampling of the EKG recording showed sinus rhythm, with ST segment abnormalities (early repolarization?)    EVENTS:    No clinical events were captured or reported.    NOTE: This is a technically-limited study due to abundant myogenic, movement, and lead artifact obscuring most of the recording.    INTERPRETATION:  Abnormal, technically-limited video EEG recording in the awake, drowsy, and sleep state(s):  No definitive seizures.   Rare generalized or frontally maximal sharply contoured rhythmic 1.5-2 Hz delta activity, findings that are of unclear significance.   Intermittent posterior quadrant slowing, at times with a shifting asymmetry and rarely becoming rhythmic at 1.5-2.5 Hz.  Frequent posterior quadrant (occipital max) discharges, often with a shifting asymmetry but generally more frequently maximal over the left>right posterior quadrant.  They became abundantly present in sleep. At times, discharges became briefly periodic or semi-periodic at 1-1.5 Hz. These abnormalities are similar to prior EEGs and are most consistent with a focal epilepsy.   No clinical events  Compared to yesterday's study, no major differences were seen        Willie Cutler MD  Department of Neurology at [facility]  General Neurologist and Epileptologist  Director of [facility]'s Level III Comprehensive Epilepsy Program  Professor of Clinical Neurology, [facility].   Phone: [phone]  Fax: [phone]  E-mail: [email or web address]

## 2024-04-10 NOTE — PROGRESS NOTES
Monitor summary: SB/SR 56-96, MO -0.20, QRS -0.09, QT -0.40, with rare PAC per strip from the monitor room.

## 2024-04-10 NOTE — PROGRESS NOTES
"NEUROLOGY EMU PROGRESS NOTE 4/10/2024     REFERRING PROVIDER: Dr. Nicholas    REASON FOR ADMISSION: Reason for EMU Admission: Medication titration/optimization and Control seizure     HISTORY OF PRESENT ILLNESS: Myriam Piper is a 18 y.o. female who presents to the EMU for evaluation. Per Dr. Nicholas, patient has had significant mood disturbances likely as a side effect of the Keppra because these symptoms started around the same time as her seizures recurred and Keppra was prescribed.  Plan/Goal of the EMU admission is to stop the Keppra on admission, monitor EEG on Vimpat and add a second agent if necessary.    INTERVAL HISTORY   No complaints. No issues. Mood is much better today. Patient's low back pain is better. Her U/A was not suggestive of a UTI.    No seizures. No push button events. Patient's EEG is similar to prior EEGs with posterior discharges, often left>right, and posterior slowing, at times rhythmic.     She was started on Topamax last night and appears to be tolerating  it well with no major concerns/complaints.     COMPLETE HOME MED LIST:   topiramate, 100 mg, Q12HRS  enoxaparin (LOVENOX) injection, 40 mg, DAILY AT 1800  senna-docusate, 2 Tablet, BID  lacosamide, 200 mg, BID        PHYSICAL EXAM:   BP (!) 132/100   Pulse 71   Temp 36.5 °C (97.7 °F) (Temporal)   Resp 18   Ht 1.702 m (5' 7.01\")   Wt (!) 138 kg (304 lb 3.8 oz)   SpO2 93%     Physical Exam  HENT:      Head: Normocephalic and atraumatic.      Nose: Nose normal. No congestion.      Mouth/Throat:      Pharynx: No oropharyngeal exudate.   Eyes:      Extraocular Movements: EOM normal. No nystagmus.   Cardiovascular:      Rate and Rhythm: Normal rate and regular rhythm.   Musculoskeletal:      Cervical back: Normal range of motion.   Neurological:      Mental Status: She is alert.   Psychiatric:         Speech: Speech normal.        NEUROLOGICAL EXAM:   Neurological Exam  Mental Status  Alert. Speech is normal. Language is fluent with " "no aphasia. Attention and concentration are normal.    Cranial Nerves  CN III, IV, VI: Extraocular movements intact bilaterally. No nystagmus. Normal saccades. Normal smooth pursuit.  CN V: Facial sensation is normal.  CN VII: Full and symmetric facial movement.  CN XI: Shoulder shrug strength is normal.  CN XII: Tongue midline without atrophy or fasciculations.    Motor   No abnormal involuntary movements. No pronator drift.    Sensory  Light touch is normal in upper and lower extremities.     Coordination  Right: Finger-to-nose normal.Left: Finger-to-nose normal.    Gait    Deferred.       Assessment & Plan  , AND EDUCATION/COUNSELING  Myriam Piper is a 18 y.o. female who presents to the EMU for evaluation. Per Dr. Nicholas, patient has had significant mood disturbances likely as a side effect of the Keppra because these symptoms started around the same time as her seizures recurred and Keppra was prescribed.  Plan/Goal of the EMU admission is to stop the Keppra on admission (which has already been done), monitor EEG on Vimpat and add a second agent.    Added topamax on the evening of 04/09/24 at 100 mg BID. Thus far she is tolerating it well.No seizures noted. Will plan for discharge tomorrow morning if she remains stable and tolerating topamax.      FOCAL EPILEPSY  Semiology  1) GTC without aura.   Gasps for air right before a seizure. Eyes \"rolled back in the head\", foaming at the mouth, generalized tonic clonic movements. (+) History of tongue biting and urinary incontinence. Afterwards, if she is laying on the floor she is still convulsing and shivering. Eyes are open but she is not speaking or moving, just shivering like she is cold     2) Behavioral arrest with staring   Able to come out of it if someone tries to get her attention by snapping.      3) Twitching episodes at random  Continuous VEEG monitoring  Vitals and neurological checks as ordered  Telemetry  Admission labs  Other diagnostics if " applicable: NA  HV and PS to be performed on Day 1 of admission and at the discretion of the attending epileptologist on subsequent days  Rescue Ativan Protocol ordered  Sleep deprivation: No  ANTISEIZURE REGIMEN  DAY 1:  Continue home vimpat 200 mg BID  Stopped keppra 1000 mg BID after the morning dose was taken on 04/08/24   DAY 2:  Continue home vimpat 200 mg BID  Started topamax 100 mg BID in the evening  DAY 3:  Continue home vimpat 200 mg BID  Continue topamax 100 mg BID in the evening    Mood disturbance/irritability  -likely a side effect of keppra  -keppra stopped upon admission as detailed above  -topamax may help with her mood symptoms  -she should still follow-up with psychiatry in the outpatient setting    Intractable Nausea (improving)  -Zonfran and reglan prn    Severe Obesity with BMI of 47  -topamax started for seizure control. It can have the secondary benefit of weight loss  -Should should follow-up with her family doctor to discuss weight loss strategies    Chronic intermittent low Back pain (resolved)  -Analgesics prn (IV toradol and tylenol)  -U/A not suggestive of a UTI  -Heating pad prn    OTHER ITEMS:  DVT Ppx: Lovenox and/or SCDs  DIET: Regular  Bowel regimen  PRN analgesics available for pain  PRN antiemetics available    CODE STATUS:   FULL CODE    BILLING DOCUMENTATION:     I spent a total of 65 minutes of face-to-face time in this visit. Over 50% of the time of the visit today was spent on counseling and/or coordination of care wtih the patient and/or family, as above in assessment in plan.    Willie Cutler MD  Department of Neurology at [facility]  General Neurologist and Epileptologist  Director of [facility] Level III Comprehensive Epilepsy Program  Professor of Clinical Neurology, [facility].   Phone: [phone]  Fax: [phone]  E-mail: [email or web address]

## 2024-04-10 NOTE — CARE PLAN
The patient is Stable - Low risk of patient condition declining or worsening    Shift Goals  Clinical Goals: Neuro stability; safety  Patient Goals: Pain control  Family Goals: Pt comfort    Progress made toward(s) clinical / shift goals:      Problem: Knowledge Deficit - Standard  Goal: Patient and family/care givers will demonstrate understanding of plan of care, disease process/condition, diagnostic tests and medications  Description: Target End Date:  1-3 days or as soon as patient condition allows    Document in Patient Education    1.  Patient and family/caregiver oriented to unit, equipment, visitation policy and means for communicating concern  2.  Complete/review Learning Assessment  3.  Assess knowledge level of disease process/condition, treatment plan, diagnostic tests and medications  4.  Explain disease process/condition, treatment plan, diagnostic tests and medications  Outcome: Progressing     Problem: Seizure Precautions  Goal: Implementation of seizure precautions  Description: Target End Date:  Prior to discharge or change in level of care    1.  Padded side rails up at all times  2.  Suction equipment and oxygen delivery system at bedside  3.  Continuous pulse oximeter in use  4.  Implement fall precautions, bed alarm on, bed in lowest position  5.  IV access (per order)  6.  Provide low stimulus environment, avoid exposure to triggers  7.  Instruct patient to use call light/seizure button if having warning signs of impending seizure  Outcome: Progressing     Problem: Knowledge Deficit - Seizures  Goal: Knowledge of seizure treatment regimen will improve  Description: Target End Date:  1-3 days or as soon as patient condition allows    Document in Patient Education    1.  Educate patient and family/caregiver of importance of seizure precautions in hospital  2.  Educate patient and family/caregiver to report auras and triggers prior to events/ EMU event reporting  3.  Review anticonvulsant  medication regimen and side effects  Outcome: Progressing

## 2024-04-11 ENCOUNTER — PHARMACY VISIT (OUTPATIENT)
Dept: PHARMACY | Facility: MEDICAL CENTER | Age: 19
End: 2024-04-11
Payer: COMMERCIAL

## 2024-04-11 VITALS
TEMPERATURE: 97.5 F | WEIGHT: 293 LBS | DIASTOLIC BLOOD PRESSURE: 81 MMHG | HEIGHT: 67 IN | RESPIRATION RATE: 18 BRPM | OXYGEN SATURATION: 96 % | HEART RATE: 76 BPM | BODY MASS INDEX: 45.99 KG/M2 | SYSTOLIC BLOOD PRESSURE: 144 MMHG

## 2024-04-11 LAB — LACOSAMIDE SERPL-MCNC: <0.5 UG/ML (ref 1–10)

## 2024-04-11 PROCEDURE — 4A10X4Z MONITORING OF CENTRAL NERVOUS ELECTRICAL ACTIVITY, EXTERNAL APPROACH: ICD-10-PCS | Performed by: STUDENT IN AN ORGANIZED HEALTH CARE EDUCATION/TRAINING PROGRAM

## 2024-04-11 PROCEDURE — A9270 NON-COVERED ITEM OR SERVICE: HCPCS | Performed by: STUDENT IN AN ORGANIZED HEALTH CARE EDUCATION/TRAINING PROGRAM

## 2024-04-11 PROCEDURE — 700101 HCHG RX REV CODE 250: Performed by: STUDENT IN AN ORGANIZED HEALTH CARE EDUCATION/TRAINING PROGRAM

## 2024-04-11 PROCEDURE — 700102 HCHG RX REV CODE 250 W/ 637 OVERRIDE(OP): Performed by: STUDENT IN AN ORGANIZED HEALTH CARE EDUCATION/TRAINING PROGRAM

## 2024-04-11 PROCEDURE — 95713 VEEG 2-12 HR CONT MNTR: CPT | Performed by: STUDENT IN AN ORGANIZED HEALTH CARE EDUCATION/TRAINING PROGRAM

## 2024-04-11 PROCEDURE — RXMED WILLOW AMBULATORY MEDICATION CHARGE: Performed by: STUDENT IN AN ORGANIZED HEALTH CARE EDUCATION/TRAINING PROGRAM

## 2024-04-11 PROCEDURE — 99239 HOSP IP/OBS DSCHRG MGMT >30: CPT | Mod: 25 | Performed by: STUDENT IN AN ORGANIZED HEALTH CARE EDUCATION/TRAINING PROGRAM

## 2024-04-11 PROCEDURE — 95718 EEG PHYS/QHP 2-12 HR W/VEEG: CPT | Performed by: STUDENT IN AN ORGANIZED HEALTH CARE EDUCATION/TRAINING PROGRAM

## 2024-04-11 RX ORDER — TOPIRAMATE 100 MG/1
100 TABLET, FILM COATED ORAL EVERY 12 HOURS
Qty: 60 TABLET | Refills: 3 | Status: SHIPPED | OUTPATIENT
Start: 2024-04-11

## 2024-04-11 RX ADMIN — SENNOSIDES AND DOCUSATE SODIUM 2 TABLET: 50; 8.6 TABLET ORAL at 06:59

## 2024-04-11 RX ADMIN — LACOSAMIDE 200 MG: 100 TABLET, FILM COATED ORAL at 07:49

## 2024-04-11 RX ADMIN — TOPIRAMATE 100 MG: 100 TABLET, FILM COATED ORAL at 06:59

## 2024-04-11 ASSESSMENT — FIBROSIS 4 INDEX: FIB4 SCORE: 0.25

## 2024-04-11 ASSESSMENT — PAIN DESCRIPTION - PAIN TYPE: TYPE: ACUTE PAIN

## 2024-04-11 NOTE — PROGRESS NOTES
Monitor Summary: SB/SR 55-85, ID 0.20, QRS 0.06, QT 0.33, with rare PACs per strip from monitor room.

## 2024-04-11 NOTE — PROCEDURES
VIDEO ELECTROENCEPHALOGRAM REPORT - EPILEPSY MONITORING UNIT (EMU)     REFERRING PROVIDER: Dr Nicholas  DOS: 4/11/2024   ROOM: [place] [id number]   TOTAL RECORDING TIME: 2 hours and 19 minutes of total recording time    INDICATION:  Myriam Piper 18 y.o. female presenting with history of seizures and/or epilepsy    RELEVANT MEDICATIONS/TREATMENTS:   Vimpat 200 mg BID  Topamax 100 mg BID started the evening of  04/9/24     TECHNIQUE:   CVEEG was set up by a Neurodiagnostic technologist who performed education to the patient and staff. A minimum of 23 electrodes and 23 channel recording was setup and performed by Neurodiagnostic technologist, in accordance with the international 10-20 system. Impedence, electrode integrity, and technical impressions were documented a minimum of every 2-24 hour period by a Neurodiagnostic Technologist and reviewed by Interpreting Physician. The study was reviewed in bipolar and referential montages. The recording examined the patient in the awake and drowsy state(s).     DESCRIPTION OF THE RECORD:  During wakefulness, the background was continuous and showed a 9 Hz posterior dominant rhythm.  There was reactivity to eye closure/opening.  A normal anterior-posterior gradient was noted with faster beta frequencies seen anteriorly.  During drowsiness, theta/delta frequencies were seen.    Sleep was not seen    ICTAL AND INTERICTAL FINDINGS:   Frequent posterior quadrant (occipital>parietal/posterior temporal) sharp waves, often with a shifting asymmetry but generally more frequently maximal over the left>right posterior quadrant.  At times, discharges became briefly periodic or semi-periodic at 1-1.5 Hz and became abundantly present in sleep.    Intermittent posterior quadrant slowing, at times with a shifting asymmetry and rarely becoming rhythmic at 1.5-2.5 Hz.    Definitive seizures    ACTIVATION PROCEDURES:   NA    EKG: Sampling of the EKG recording showed sinus rhythm, with ST segment  abnormalities (early repolarization?)    EVENTS:    No clinical events were captured or reported.    NOTE: This is a very technically-limited study due to abundant myogenic, movement, and lead artifact obscuring most of the recording.    INTERPRETATION:  Abnormal, very technically-limited video EEG recording in the awake, drowsy state(s):  No definitive seizures.   Intermittent posterior quadrant slowing, at times with a shifting asymmetry and rarely becoming rhythmic at 1.5-2.5 Hz.  Frequent posterior quadrant (occipital max) discharges, often with a shifting asymmetry but generally more frequently maximal over the left>right posterior quadrant.  They became abundantly present in sleep. At times, discharges became briefly periodic or semi-periodic at 1-1.5 Hz. These abnormalities are similar to prior EEGs and are most consistent with a focal epilepsy.   No clinical events  Compared to prior studies from this admission, this one is similar        Willie Cutler MD  Department of Neurology at [facility]  General Neurologist and Epileptologist  Director of [facility]'s Level III Comprehensive Epilepsy Program  Professor of Clinical Neurology, [facility].   Phone: [phone]  Fax: [phone]  E-mail: [email or web address]

## 2024-04-11 NOTE — PROGRESS NOTES
Meds to beds delivered to patient and her mom Tina at the bedside. PIV removed. Discharge paperwork reviewed with patient and her mom. Pt was very eager to go home. All questions answered.
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
## 2024-04-11 NOTE — CARE PLAN
The patient is Stable - Low risk of patient condition declining or worsening    Shift Goals  Clinical Goals: Neuro stability; safety  Patient Goals: discharge  Family Goals: Pt comfort    Progress made toward(s) clinical / shift goals:      Problem: Knowledge Deficit - Standard  Goal: Patient and family/care givers will demonstrate understanding of plan of care, disease process/condition, diagnostic tests and medications  Description: Target End Date:  1-3 days or as soon as patient condition allows    Document in Patient Education    1.  Patient and family/caregiver oriented to unit, equipment, visitation policy and means for communicating concern  2.  Complete/review Learning Assessment  3.  Assess knowledge level of disease process/condition, treatment plan, diagnostic tests and medications  4.  Explain disease process/condition, treatment plan, diagnostic tests and medications  Outcome: Progressing  Note: Pt states understanding of condition     Problem: Medication  Goal: Risk for seizure medication side effects will decrease  Description: Target End Date:  Prior to discharge or change in level of care    1.  Monitor for potential side effects of medications  2.  Monitor for signs and symptoms of medication toxicity  3.  Monitor lab values for medications for toxicity and subtherapeutic levels  Outcome: Progressing  Note: Pt mother states that seizure meds make pt drowsy; no signs of toxicity     Problem: Neuro Status  Goal: Neuro status will remain stable or improve  Description: Target End Date:  Prior to discharge or change in level of care    Document on Neuro assessment in the Assessment flowsheet    1.  Assess and monitor neurologic status per provider order/protocol/unit policy  2.  Assess level of consciousness and orientation  3.  Assess for speech, dysarthria, dysphagia, facial symmetry  4.  Assess visual field, eye movements, gaze preference, pupil reaction and size  5.  Assess muscle strength and  motor response in all four extremities  6.  Assess for sensation (numbness and tingling)  7.  Assess basic neuro reflexes (cough, gag, corneal)  8.  Identify changes in neuro status and report to provider for testing/treatment orders  Outcome: Progressing  Note: Pt maintaining stable neuro status

## 2024-04-12 ENCOUNTER — TELEPHONE (OUTPATIENT)
Dept: HEALTH INFORMATION MANAGEMENT | Facility: OTHER | Age: 19
End: 2024-04-12
Payer: MEDICAID

## 2024-05-02 ENCOUNTER — APPOINTMENT (OUTPATIENT)
Dept: NEUROLOGY | Facility: MEDICAL CENTER | Age: 19
End: 2024-05-02
Attending: STUDENT IN AN ORGANIZED HEALTH CARE EDUCATION/TRAINING PROGRAM
Payer: MEDICAID

## 2024-07-24 ENCOUNTER — TELEPHONE (OUTPATIENT)
Dept: NEUROLOGY | Facility: MEDICAL CENTER | Age: 19
End: 2024-07-24

## 2024-08-02 DIAGNOSIS — G40.109 FOCAL EPILEPSY (HCC): ICD-10-CM

## 2024-08-02 DIAGNOSIS — R51.9 NONINTRACTABLE EPISODIC HEADACHE, UNSPECIFIED HEADACHE TYPE: ICD-10-CM

## 2024-08-02 RX ORDER — TOPIRAMATE 100 MG/1
100 TABLET, FILM COATED ORAL EVERY 12 HOURS
Qty: 60 TABLET | Refills: 2 | OUTPATIENT
Start: 2024-08-02

## 2024-08-09 ENCOUNTER — OFFICE VISIT (OUTPATIENT)
Dept: NEUROLOGY | Facility: MEDICAL CENTER | Age: 19
End: 2024-08-09
Attending: STUDENT IN AN ORGANIZED HEALTH CARE EDUCATION/TRAINING PROGRAM
Payer: MEDICAID

## 2024-08-09 ENCOUNTER — TELEPHONE (OUTPATIENT)
Dept: NEUROLOGY | Facility: MEDICAL CENTER | Age: 19
End: 2024-08-09

## 2024-08-09 VITALS
DIASTOLIC BLOOD PRESSURE: 70 MMHG | HEIGHT: 67 IN | RESPIRATION RATE: 16 BRPM | BODY MASS INDEX: 45.99 KG/M2 | OXYGEN SATURATION: 99 % | SYSTOLIC BLOOD PRESSURE: 122 MMHG | WEIGHT: 293 LBS | HEART RATE: 74 BPM

## 2024-08-09 DIAGNOSIS — R56.9 SEIZURE (HCC): ICD-10-CM

## 2024-08-09 DIAGNOSIS — G40.109 FOCAL EPILEPSY (HCC): ICD-10-CM

## 2024-08-09 DIAGNOSIS — R51.9 NONINTRACTABLE EPISODIC HEADACHE, UNSPECIFIED HEADACHE TYPE: ICD-10-CM

## 2024-08-09 DIAGNOSIS — R56.9 SEIZURES (HCC): ICD-10-CM

## 2024-08-09 PROCEDURE — 99211 OFF/OP EST MAY X REQ PHY/QHP: CPT | Performed by: STUDENT IN AN ORGANIZED HEALTH CARE EDUCATION/TRAINING PROGRAM

## 2024-08-09 PROCEDURE — 3078F DIAST BP <80 MM HG: CPT | Performed by: STUDENT IN AN ORGANIZED HEALTH CARE EDUCATION/TRAINING PROGRAM

## 2024-08-09 PROCEDURE — 99214 OFFICE O/P EST MOD 30 MIN: CPT | Performed by: STUDENT IN AN ORGANIZED HEALTH CARE EDUCATION/TRAINING PROGRAM

## 2024-08-09 PROCEDURE — 3074F SYST BP LT 130 MM HG: CPT | Performed by: STUDENT IN AN ORGANIZED HEALTH CARE EDUCATION/TRAINING PROGRAM

## 2024-08-09 RX ORDER — FOLIC ACID 1 MG/1
2 TABLET ORAL DAILY
Qty: 180 TABLET | Refills: 3 | Status: SHIPPED | OUTPATIENT
Start: 2024-08-09 | End: 2025-08-04

## 2024-08-09 RX ORDER — TOPIRAMATE 100 MG/1
100 TABLET, FILM COATED ORAL EVERY 12 HOURS
Qty: 180 TABLET | Refills: 1 | Status: SHIPPED | OUTPATIENT
Start: 2024-08-09 | End: 2025-02-05

## 2024-08-09 RX ORDER — MULTIVIT-MIN/IRON/FOLIC ACID/K 18-600-40
2000 CAPSULE ORAL DAILY
Qty: 90 CAPSULE | Refills: 3 | Status: SHIPPED | OUTPATIENT
Start: 2024-08-09 | End: 2025-08-04

## 2024-08-09 RX ORDER — LACOSAMIDE 100 MG/1
200 TABLET ORAL 2 TIMES DAILY
Qty: 360 TABLET | Refills: 1 | Status: SHIPPED | OUTPATIENT
Start: 2024-08-09 | End: 2025-02-05

## 2024-08-09 ASSESSMENT — FIBROSIS 4 INDEX: FIB4 SCORE: 0.25

## 2024-08-09 NOTE — TELEPHONE ENCOUNTER
Prior Authorization for Midazolam  has been approved for a quantity of 2 , day supply 30    Prior Authorization reference number: [id number]  Insurance: [place] Medicaid  Effective dates: 08/09/24-02/05/25  Copay: $0

## 2024-08-09 NOTE — TELEPHONE ENCOUNTER
Received [employer] request via [employer]  for   Midazolam 5 MG/0.1ML Solution  . (Quantity:2, Day Supply:30)     Insurance: [place] MEDICAID  Member ID:  [id number]  BIN: [id number]  PCN: [id number]  [id number]     Ran Test claim via [employer] medication Rejects stating prior authorization is required.

## 2024-08-09 NOTE — PATIENT INSTRUCTIONS
Seizure rescue plan    For any seizure lasting > 5 minutes, please administer 1 dose midazolam into one nostril. If seizure has not resolved in 1 minute, please administer a second dose into the other nostril.     Midazolam 5 MG/0.1ML Solution          Administer 1 Dose into affected nostril(S) one time as needed (seizure > 5 minutes) for up to 30 days. Administer 1 dose into nostril(S) one time as needed (seizure > 5 minutes) for up to 2 doses. Indications: Seizure, Disp-5 Each, R-1, Normal

## 2024-08-09 NOTE — TELEPHONE ENCOUNTER
Prior Authorization for Midazolam (Quantity: 2, Days: 30) has been submitted via Cover My Meds: Key ([id number])    Insurance: [place] Medicaid    Will follow up in 24-48 business hours.
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
## 2025-01-03 DIAGNOSIS — R56.9 SEIZURES (HCC): ICD-10-CM

## 2025-01-03 RX ORDER — LACOSAMIDE 100 MG/1
200 TABLET ORAL 2 TIMES DAILY
Qty: 240 TABLET | Refills: 0 | Status: SHIPPED | OUTPATIENT
Start: 2025-01-03 | End: 2025-03-04

## 2025-01-03 NOTE — TELEPHONE ENCOUNTER
Patients mom has called and they need a new RX for her lacosamide as she has been out of the medication for almost a month. This is the first attempt to get this medication filled

## 2025-02-04 ENCOUNTER — APPOINTMENT (OUTPATIENT)
Dept: NEUROLOGY | Facility: MEDICAL CENTER | Age: 20
End: 2025-02-04
Attending: STUDENT IN AN ORGANIZED HEALTH CARE EDUCATION/TRAINING PROGRAM
Payer: MEDICAID

## 2025-02-23 DIAGNOSIS — R51.9 NONINTRACTABLE EPISODIC HEADACHE, UNSPECIFIED HEADACHE TYPE: ICD-10-CM

## 2025-02-23 DIAGNOSIS — G40.109 FOCAL EPILEPSY (HCC): ICD-10-CM

## 2025-02-24 NOTE — TELEPHONE ENCOUNTER
Received request via: Pharmacy    Medication Name/Dosage topiramate (TOPAMAX) 100 MG Tab     When was medication last prescribed  01/03/2025    How many refills were previously provided 0    How many Refills does he patient have left from last prescription 0    Was the patient seen in the last year in this department? Yes   Date of last office visit 08/09/2024     Per last Neurology Office Visit, when was the date of next follow up visit set for?                            Date of office visit follow up request 6 mo     Does the patient have an upcoming appointment? Yes   If yes, when 02/28/2025             If no, schedule appointment 0    Does the patient have care home Plus and need 100 day supply (blood pressure, diabetes and cholesterol meds only)? Patient does not have SCP

## 2025-02-28 ENCOUNTER — APPOINTMENT (OUTPATIENT)
Dept: NEUROLOGY | Facility: MEDICAL CENTER | Age: 20
End: 2025-02-28
Attending: STUDENT IN AN ORGANIZED HEALTH CARE EDUCATION/TRAINING PROGRAM
Payer: MEDICAID

## 2025-02-28 RX ORDER — TOPIRAMATE 100 MG/1
100 TABLET, FILM COATED ORAL EVERY 12 HOURS
Qty: 60 TABLET | Refills: 0 | Status: SHIPPED | OUTPATIENT
Start: 2025-02-28 | End: 2025-03-30